# Patient Record
Sex: FEMALE | Race: NATIVE HAWAIIAN OR OTHER PACIFIC ISLANDER | HISPANIC OR LATINO
[De-identification: names, ages, dates, MRNs, and addresses within clinical notes are randomized per-mention and may not be internally consistent; named-entity substitution may affect disease eponyms.]

---

## 2018-11-15 ENCOUNTER — RESULT REVIEW (OUTPATIENT)
Age: 28
End: 2018-11-15

## 2019-03-25 PROBLEM — Z00.00 ENCOUNTER FOR PREVENTIVE HEALTH EXAMINATION: Status: ACTIVE | Noted: 2019-03-25

## 2019-03-26 ENCOUNTER — ASOB RESULT (OUTPATIENT)
Age: 29
End: 2019-03-26

## 2019-03-26 ENCOUNTER — APPOINTMENT (OUTPATIENT)
Dept: ANTEPARTUM | Facility: CLINIC | Age: 29
End: 2019-03-26
Payer: COMMERCIAL

## 2019-03-26 PROCEDURE — 76811 OB US DETAILED SNGL FETUS: CPT

## 2019-06-14 ENCOUNTER — APPOINTMENT (OUTPATIENT)
Dept: ANTEPARTUM | Facility: CLINIC | Age: 29
End: 2019-06-14

## 2019-06-17 ENCOUNTER — APPOINTMENT (OUTPATIENT)
Dept: ANTEPARTUM | Facility: CLINIC | Age: 29
End: 2019-06-17
Payer: COMMERCIAL

## 2019-06-17 ENCOUNTER — ASOB RESULT (OUTPATIENT)
Age: 29
End: 2019-06-17

## 2019-06-17 PROCEDURE — 76819 FETAL BIOPHYS PROFIL W/O NST: CPT

## 2019-06-17 PROCEDURE — 76816 OB US FOLLOW-UP PER FETUS: CPT

## 2019-07-25 ENCOUNTER — APPOINTMENT (OUTPATIENT)
Dept: ANTEPARTUM | Facility: HOSPITAL | Age: 29
End: 2019-07-25

## 2019-07-25 ENCOUNTER — ASOB RESULT (OUTPATIENT)
Age: 29
End: 2019-07-25

## 2019-07-25 ENCOUNTER — OUTPATIENT (OUTPATIENT)
Dept: INPATIENT UNIT | Facility: HOSPITAL | Age: 29
LOS: 1 days | Discharge: HOME | End: 2019-07-25
Payer: COMMERCIAL

## 2019-07-25 VITALS
HEART RATE: 70 BPM | DIASTOLIC BLOOD PRESSURE: 69 MMHG | RESPIRATION RATE: 17 BRPM | TEMPERATURE: 98 F | SYSTOLIC BLOOD PRESSURE: 107 MMHG

## 2019-07-25 VITALS — SYSTOLIC BLOOD PRESSURE: 91 MMHG | DIASTOLIC BLOOD PRESSURE: 55 MMHG | HEART RATE: 68 BPM

## 2019-07-25 DIAGNOSIS — O26.899 OTHER SPECIFIED PREGNANCY RELATED CONDITIONS, UNSPECIFIED TRIMESTER: ICD-10-CM

## 2019-07-25 DIAGNOSIS — Z3A.00 WEEKS OF GESTATION OF PREGNANCY NOT SPECIFIED: ICD-10-CM

## 2019-07-25 PROBLEM — Z00.00 ENCOUNTER FOR PREVENTIVE HEALTH EXAMINATION: Status: ACTIVE | Noted: 2019-07-25

## 2019-07-25 PROCEDURE — 59025 FETAL NON-STRESS TEST: CPT | Mod: 26

## 2019-07-25 NOTE — OB PROVIDER TRIAGE NOTE - NSOBPROVIDERNOTE_OBGYN_ALL_OB_FT
27yo P0 @ 38.0 wks SLIUP uncomp PNC here co decreased FM since yesterday  -Awaiting official ATALBERTO ingram 29yo P0 @ 38.0 wks SLIUP uncomp PNC here co decreased FM since yesterday  -ATU sono reveals 8/8 BPP; pt reports GFM while sono is being performed  -await NST 29yo P0 @ 38.0 wks SLIUP uncomp PNC here co decreased FM since yesterday  -ATU sono reveals 8/8 BPP; pt reports GFM while sono is being performed  -NST is cat I with accels and mod variability; irreg ctx's.   -pt was dw Dr Oviedo (in to see pt). Pt was dc home with instructions to keep her appt for Monday

## 2019-07-25 NOTE — OB PROVIDER TRIAGE NOTE - HISTORY OF PRESENT ILLNESS
27yo P0 @ 38.0 wks SLIUP uncomp PNC here co decreased FM since yesterday. Pt is intact and denies VB/LOF/ctx's.    Pmhx-denies  Pshx/Hosp-denies  Meds-PNV  NKDA  Past ob/Gyn-denies

## 2019-08-07 ENCOUNTER — TRANSCRIPTION ENCOUNTER (OUTPATIENT)
Age: 29
End: 2019-08-07

## 2019-08-07 ENCOUNTER — INPATIENT (INPATIENT)
Facility: HOSPITAL | Age: 29
LOS: 1 days | Discharge: HOME | End: 2019-08-09
Attending: OBSTETRICS & GYNECOLOGY | Admitting: OBSTETRICS & GYNECOLOGY

## 2019-08-07 VITALS
DIASTOLIC BLOOD PRESSURE: 72 MMHG | SYSTOLIC BLOOD PRESSURE: 121 MMHG | RESPIRATION RATE: 16 BRPM | TEMPERATURE: 98 F | HEART RATE: 83 BPM

## 2019-08-07 DIAGNOSIS — O26.899 OTHER SPECIFIED PREGNANCY RELATED CONDITIONS, UNSPECIFIED TRIMESTER: ICD-10-CM

## 2019-08-07 DIAGNOSIS — Z3A.00 WEEKS OF GESTATION OF PREGNANCY NOT SPECIFIED: ICD-10-CM

## 2019-08-07 LAB
BASOPHILS # BLD AUTO: 0.02 K/UL — SIGNIFICANT CHANGE UP (ref 0–0.2)
BASOPHILS NFR BLD AUTO: 0.2 % — SIGNIFICANT CHANGE UP (ref 0–2)
BLD GP AB SCN SERPL QL: NEGATIVE — SIGNIFICANT CHANGE UP
EOSINOPHIL # BLD AUTO: 0 K/UL — SIGNIFICANT CHANGE UP (ref 0–0.5)
EOSINOPHIL NFR BLD AUTO: 0 % — SIGNIFICANT CHANGE UP (ref 0–6)
HBV SURFACE AG SER-ACNC: NEGATIVE — SIGNIFICANT CHANGE UP
HCT VFR BLD CALC: 35.9 % — SIGNIFICANT CHANGE UP (ref 34.5–45)
HGB BLD-MCNC: 11.3 G/DL — LOW (ref 11.5–15.5)
HIV COMBO RESULT: SIGNIFICANT CHANGE UP
HIV1+2 AB SPEC QL: SIGNIFICANT CHANGE UP
IMM GRANULOCYTES NFR BLD AUTO: 0.6 % — SIGNIFICANT CHANGE UP (ref 0–1.5)
LYMPHOCYTES # BLD AUTO: 1.37 K/UL — SIGNIFICANT CHANGE UP (ref 1–3.3)
LYMPHOCYTES # BLD AUTO: 11.4 % — LOW (ref 13–44)
MCHC RBC-ENTMCNC: 26.4 PG — LOW (ref 27–34)
MCHC RBC-ENTMCNC: 31.5 % — LOW (ref 32–36)
MCV RBC AUTO: 83.9 FL — SIGNIFICANT CHANGE UP (ref 80–100)
MONOCYTES # BLD AUTO: 0.61 K/UL — SIGNIFICANT CHANGE UP (ref 0–0.9)
MONOCYTES NFR BLD AUTO: 5.1 % — SIGNIFICANT CHANGE UP (ref 2–14)
NEUTROPHILS # BLD AUTO: 9.9 K/UL — HIGH (ref 1.8–7.4)
NEUTROPHILS NFR BLD AUTO: 82.7 % — HIGH (ref 43–77)
NRBC # FLD: 0 K/UL — SIGNIFICANT CHANGE UP (ref 0–0)
PLATELET # BLD AUTO: 158 K/UL — SIGNIFICANT CHANGE UP (ref 150–400)
PMV BLD: 12.1 FL — SIGNIFICANT CHANGE UP (ref 7–13)
RBC # BLD: 4.28 M/UL — SIGNIFICANT CHANGE UP (ref 3.8–5.2)
RBC # FLD: 22.5 % — HIGH (ref 10.3–14.5)
RH IG SCN BLD-IMP: POSITIVE — SIGNIFICANT CHANGE UP
WBC # BLD: 11.97 K/UL — HIGH (ref 3.8–10.5)
WBC # FLD AUTO: 11.97 K/UL — HIGH (ref 3.8–10.5)

## 2019-08-07 RX ORDER — OXYTOCIN 10 UNIT/ML
2 VIAL (ML) INJECTION
Qty: 30 | Refills: 0 | Status: DISCONTINUED | OUTPATIENT
Start: 2019-08-07 | End: 2019-08-08

## 2019-08-07 RX ORDER — DOCUSATE SODIUM 100 MG
100 CAPSULE ORAL
Refills: 0 | Status: DISCONTINUED | OUTPATIENT
Start: 2019-08-07 | End: 2019-08-08

## 2019-08-07 RX ORDER — SIMETHICONE 80 MG/1
80 TABLET, CHEWABLE ORAL EVERY 4 HOURS
Refills: 0 | Status: DISCONTINUED | OUTPATIENT
Start: 2019-08-07 | End: 2019-08-09

## 2019-08-07 RX ORDER — BENZOCAINE 10 %
1 GEL (GRAM) MUCOUS MEMBRANE EVERY 6 HOURS
Refills: 0 | Status: DISCONTINUED | OUTPATIENT
Start: 2019-08-07 | End: 2019-08-09

## 2019-08-07 RX ORDER — AER TRAVELER 0.5 G/1
1 SOLUTION RECTAL; TOPICAL EVERY 4 HOURS
Refills: 0 | Status: DISCONTINUED | OUTPATIENT
Start: 2019-08-07 | End: 2019-08-09

## 2019-08-07 RX ORDER — SODIUM CHLORIDE 9 MG/ML
1000 INJECTION, SOLUTION INTRAVENOUS
Refills: 0 | Status: DISCONTINUED | OUTPATIENT
Start: 2019-08-07 | End: 2019-08-07

## 2019-08-07 RX ORDER — HYDROCORTISONE 1 %
1 OINTMENT (GRAM) TOPICAL EVERY 6 HOURS
Refills: 0 | Status: DISCONTINUED | OUTPATIENT
Start: 2019-08-07 | End: 2019-08-09

## 2019-08-07 RX ORDER — OXYTOCIN 10 UNIT/ML
333.33 VIAL (ML) INJECTION
Qty: 20 | Refills: 0 | Status: DISCONTINUED | OUTPATIENT
Start: 2019-08-07 | End: 2019-08-08

## 2019-08-07 RX ORDER — SODIUM CHLORIDE 9 MG/ML
3 INJECTION INTRAMUSCULAR; INTRAVENOUS; SUBCUTANEOUS EVERY 8 HOURS
Refills: 0 | Status: DISCONTINUED | OUTPATIENT
Start: 2019-08-07 | End: 2019-08-09

## 2019-08-07 RX ORDER — PRAMOXINE HYDROCHLORIDE 150 MG/15G
1 AEROSOL, FOAM RECTAL EVERY 4 HOURS
Refills: 0 | Status: DISCONTINUED | OUTPATIENT
Start: 2019-08-07 | End: 2019-08-09

## 2019-08-07 RX ORDER — LANOLIN
1 OINTMENT (GRAM) TOPICAL EVERY 6 HOURS
Refills: 0 | Status: DISCONTINUED | OUTPATIENT
Start: 2019-08-07 | End: 2019-08-09

## 2019-08-07 RX ORDER — GLYCERIN ADULT
1 SUPPOSITORY, RECTAL RECTAL AT BEDTIME
Refills: 0 | Status: DISCONTINUED | OUTPATIENT
Start: 2019-08-07 | End: 2019-08-09

## 2019-08-07 RX ORDER — DIPHENHYDRAMINE HCL 50 MG
25 CAPSULE ORAL EVERY 6 HOURS
Refills: 0 | Status: DISCONTINUED | OUTPATIENT
Start: 2019-08-07 | End: 2019-08-09

## 2019-08-07 RX ORDER — OXYCODONE HYDROCHLORIDE 5 MG/1
5 TABLET ORAL
Refills: 0 | Status: DISCONTINUED | OUTPATIENT
Start: 2019-08-07 | End: 2019-08-09

## 2019-08-07 RX ORDER — TETANUS TOXOID, REDUCED DIPHTHERIA TOXOID AND ACELLULAR PERTUSSIS VACCINE, ADSORBED 5; 2.5; 8; 8; 2.5 [IU]/.5ML; [IU]/.5ML; UG/.5ML; UG/.5ML; UG/.5ML
0.5 SUSPENSION INTRAMUSCULAR ONCE
Refills: 0 | Status: DISCONTINUED | OUTPATIENT
Start: 2019-08-07 | End: 2019-08-09

## 2019-08-07 RX ORDER — OXYCODONE HYDROCHLORIDE 5 MG/1
5 TABLET ORAL ONCE
Refills: 0 | Status: DISCONTINUED | OUTPATIENT
Start: 2019-08-07 | End: 2019-08-09

## 2019-08-07 RX ORDER — IBUPROFEN 200 MG
600 TABLET ORAL EVERY 6 HOURS
Refills: 0 | Status: DISCONTINUED | OUTPATIENT
Start: 2019-08-07 | End: 2019-08-09

## 2019-08-07 RX ORDER — ONDANSETRON 8 MG/1
4 TABLET, FILM COATED ORAL ONCE
Refills: 0 | Status: COMPLETED | OUTPATIENT
Start: 2019-08-07 | End: 2019-08-07

## 2019-08-07 RX ORDER — DIBUCAINE 1 %
1 OINTMENT (GRAM) RECTAL EVERY 6 HOURS
Refills: 0 | Status: DISCONTINUED | OUTPATIENT
Start: 2019-08-07 | End: 2019-08-09

## 2019-08-07 RX ORDER — KETOROLAC TROMETHAMINE 30 MG/ML
30 SYRINGE (ML) INJECTION ONCE
Refills: 0 | Status: DISCONTINUED | OUTPATIENT
Start: 2019-08-07 | End: 2019-08-07

## 2019-08-07 RX ORDER — ACETAMINOPHEN 500 MG
975 TABLET ORAL
Refills: 0 | Status: DISCONTINUED | OUTPATIENT
Start: 2019-08-07 | End: 2019-08-09

## 2019-08-07 RX ORDER — MAGNESIUM HYDROXIDE 400 MG/1
30 TABLET, CHEWABLE ORAL
Refills: 0 | Status: DISCONTINUED | OUTPATIENT
Start: 2019-08-07 | End: 2019-08-09

## 2019-08-07 RX ADMIN — Medication 1000 MILLIUNIT(S)/MIN: at 21:43

## 2019-08-07 RX ADMIN — Medication 2 MILLIUNIT(S)/MIN: at 17:19

## 2019-08-07 RX ADMIN — Medication 30 MILLIGRAM(S): at 23:30

## 2019-08-07 RX ADMIN — ONDANSETRON 4 MILLIGRAM(S): 8 TABLET, FILM COATED ORAL at 22:39

## 2019-08-07 RX ADMIN — SODIUM CHLORIDE 125 MILLILITER(S): 9 INJECTION, SOLUTION INTRAVENOUS at 21:48

## 2019-08-07 RX ADMIN — Medication 0.25 MILLIGRAM(S): at 16:38

## 2019-08-07 RX ADMIN — Medication 2 MILLIUNIT(S)/MIN: at 13:08

## 2019-08-07 RX ADMIN — Medication 30 MILLIGRAM(S): at 22:38

## 2019-08-07 NOTE — CHART NOTE - NSCHARTNOTEFT_GEN_A_CORE
Subjective  Patient seen and evaluated at bedside for recurrent early decels. Patient reports increased pressure. While at bedside, FHR dropped to 80 bpm for 6 minutes. Terbutaline 0.25mg IM given and pitocin was stopped. Tracing improved with left lateral position, O2, and IVF    Objective  Vital Signs Last 24 Hrs  T(C): 36 (07 Aug 2019 11:02), Max: 36.8 (07 Aug 2019 09:49)  T(F): 96.8 (07 Aug 2019 11:02), Max: 98.2 (07 Aug 2019 09:49)  HR: 85 (07 Aug 2019 16:51) (59 - 96)  BP: 98/57 (07 Aug 2019 16:51) (98/57 - 152/65)  BP(mean): --  RR: 18 (07 Aug 2019 11:02) (16 - 18)  SpO2: 97% (07 Aug 2019 16:55) (91% - 99%)    Gen: NAD  VE: 9.5/100/2  FHT: 135 baseline, mod variability, +accels, neg decels  [place]: q2-3 min    A/P 27 yo  in labor   - will rest for 20min for tracing to recover  - will restart pitocin after 20min if CTX do not return     seen with Dr. Shaun Perez pgy3

## 2019-08-07 NOTE — OB PROVIDER TRIAGE NOTE - NSHPPHYSICALEXAM_GEN_ALL_CORE
Assessment reveals VSS, abdomen soft, NT, gravid.  VE 6/80/-2  EFW 8.5 by leopold  vtx confirmed by sono

## 2019-08-07 NOTE — CHART NOTE - NSCHARTNOTEFT_GEN_A_CORE
R3 Ob Note    Subjective  Patient seen at bedside. Report increased pelvic pressure. Unsure if she has experienced LOF. Comfortable with epidural in place    Objective  Vital Signs Last 24 Hrs  T(C): 36 (07 Aug 2019 11:02), Max: 36.8 (07 Aug 2019 09:49)  T(F): 96.8 (07 Aug 2019 11:02), Max: 98.2 (07 Aug 2019 09:49)  HR: 68 (07 Aug 2019 16:00) (59 - 92)  BP: 128/75 (07 Aug 2019 14:06) (101/59 - 152/65)  BP(mean): --  RR: 18 (07 Aug 2019 11:02) (16 - 18)  SpO2: 96% (07 Aug 2019 16:00) (91% - 99%)    FETAL HEART RATE   Baseline: 135 baseline, mod variability, +accels, neg decels    UTERINE CONTRACTIONS: reg ctx q2-3 min    CERVICAL EXAM:  Dilation: 8.5  Effacement: 100  Station: -1    IMPRESSION:   29 yo  39w6d a/w labor    INTERVENTIONS:  - arom, clear fluid  - continue pitocin  - peanut ball    D/w Dr. Shaun Perez pgy3

## 2019-08-07 NOTE — OB PROVIDER H&P - HISTORY OF PRESENT ILLNESS
29yo  @ 39.6 presents with c/o contractions every 3-4 minutes and bloody show. Denies LOF, and reports GFM.     Denies PMH/PSH    AP course uncomplicated  GBS unknown  EFW 8-3 last week

## 2019-08-07 NOTE — OB PROVIDER TRIAGE NOTE - HISTORY OF PRESENT ILLNESS
27yo  @ 39.6 presents with c/o contractions every 3-4 minutes and bloody show. Denies LOF, and reports GFM.     Denies PMH/PSH    AP course uncomplicated  GBS unknown  EFW 8-3 last week

## 2019-08-07 NOTE — OB NEONATOLOGY/PEDIATRICIAN DELIVERY SUMMARY - NSPEDSNEONOTESA_OBGYN_ALL_OB_FT
39.6 wk male born to a 29 y/o  mother via . Called to delivery for category II tracing. No significant maternal or prenatal hx. Maternal blood type A+. Prenatal labs negative, non-reactive and immune. GBS negative on . AROM at 15:36 (<18 hours) with clear fluids. Baby was born vigorous and crying spontaneously. W/D/S/S. APGARS 9/9. EOS 0.02    Mom is planning on breast feeding, yes hep B vaccination, yes circumcision    Physical Exam:  Skin: WWP, pink  Head: +caput succedaneum, AFOF, no dysmorphic features  Ears: no pits or tags, no deformity  Nose: nares patent  Mouth: no cleft, palate intact  Trunk: No crepitus, lungs CTAB with normal work of breathing  Cardiac: S1S2 regular rate, no murmur  Abdomen: Soft, nontender, not distended, no masses  Umbillical cord: 3 vessel  Extremities: FROM, negative ortolani/echeverria bilaterally  Spine/anus: No sacral dimple, anus patent  Genitalia: normal male external genitalia, testicles descended bilaterally  Neuro: +grasp +orlin +suck

## 2019-08-07 NOTE — OB PROVIDER DELIVERY SUMMARY - NSPROVIDERDELIVERYNOTE_OBGYN_ALL_OB_FT
Spontaneous vaginal delivery of liveborn infant from the OA position. Head, shoulders, and body delivered easily. Infant was suctioned. No mec. Cord was clamped and cut and infant was passed to mother. Placenta delivered intact with a 3 vessel cord. Fundal massage was given and uterine fundus was found to be firm. Vaginal exam revealed an intact cervix, vaginal walls and sulci. Patient had a 3rd degree laceration in the perineum that was repaired with 2.0 vicryl suture. 2.0 vicryl suture was used to tie the 4 aspects of the external anal sphincter muscle together. The rest of the tear was repaired with 2.0 vicryl suture in the usual fashion. Excellent hemostasis was noted. patient was stable and went to recovery. Count was correct x2. Spontaneous vaginal delivery of liveborn infant from the OA position. Head, shoulders, and body delivered easily. Infant was suctioned. No mec. Cord was clamped and cut and infant was passed to mother. Placenta delivered intact with a 3 vessel cord. Fundal massage was given and uterine fundus was initially atonic, but quickly became firm.  B/L sulcal tears and 3rd degree laceration in the perineum were repaired with 2.0 chromic suture.  Excellent hemostasis was noted. Fundus firm.  Vault empty.  Rectal exam WNL.

## 2019-08-07 NOTE — OB PROVIDER DELIVERY SUMMARY - NSLACERATRAPAIRDETAILS_OBGYN_ALL_OB_FT
B/L sulcal tears and 3rd degree laceration repaired with 2-0 chromic with excellent hemostasis and restoration of anatomy.

## 2019-08-08 LAB
ANISOCYTOSIS BLD QL: SLIGHT — SIGNIFICANT CHANGE UP
BASOPHILS # BLD AUTO: 0.02 K/UL — SIGNIFICANT CHANGE UP (ref 0–0.2)
BASOPHILS NFR BLD AUTO: 0.2 % — SIGNIFICANT CHANGE UP (ref 0–2)
BASOPHILS NFR SPEC: 0 % — SIGNIFICANT CHANGE UP (ref 0–2)
BLASTS # FLD: 0 % — SIGNIFICANT CHANGE UP (ref 0–0)
EOSINOPHIL # BLD AUTO: 0.03 K/UL — SIGNIFICANT CHANGE UP (ref 0–0.5)
EOSINOPHIL NFR BLD AUTO: 0.2 % — SIGNIFICANT CHANGE UP (ref 0–6)
EOSINOPHIL NFR FLD: 0 % — SIGNIFICANT CHANGE UP (ref 0–6)
HCT VFR BLD CALC: 26.3 % — LOW (ref 34.5–45)
HGB BLD-MCNC: 8.1 G/DL — LOW (ref 11.5–15.5)
IMM GRANULOCYTES NFR BLD AUTO: 0.6 % — SIGNIFICANT CHANGE UP (ref 0–1.5)
LYMPHOCYTES # BLD AUTO: 1.65 K/UL — SIGNIFICANT CHANGE UP (ref 1–3.3)
LYMPHOCYTES # BLD AUTO: 12.8 % — LOW (ref 13–44)
LYMPHOCYTES NFR SPEC AUTO: 4.4 % — LOW (ref 13–44)
MCHC RBC-ENTMCNC: 25.7 PG — LOW (ref 27–34)
MCHC RBC-ENTMCNC: 30.8 % — LOW (ref 32–36)
MCV RBC AUTO: 83.5 FL — SIGNIFICANT CHANGE UP (ref 80–100)
METAMYELOCYTES # FLD: 0 % — SIGNIFICANT CHANGE UP (ref 0–1)
MICROCYTES BLD QL: SLIGHT — SIGNIFICANT CHANGE UP
MONOCYTES # BLD AUTO: 1.01 K/UL — HIGH (ref 0–0.9)
MONOCYTES NFR BLD AUTO: 7.8 % — SIGNIFICANT CHANGE UP (ref 2–14)
MONOCYTES NFR BLD: 0 % — LOW (ref 2–9)
MYELOCYTES NFR BLD: 0 % — SIGNIFICANT CHANGE UP (ref 0–0)
NEUTROPHIL AB SER-ACNC: 93 % — HIGH (ref 43–77)
NEUTROPHILS # BLD AUTO: 10.13 K/UL — HIGH (ref 1.8–7.4)
NEUTROPHILS NFR BLD AUTO: 78.4 % — HIGH (ref 43–77)
NEUTS BAND # BLD: 0.9 % — SIGNIFICANT CHANGE UP (ref 0–6)
NRBC # FLD: 0 K/UL — SIGNIFICANT CHANGE UP (ref 0–0)
OTHER - HEMATOLOGY %: 0 — SIGNIFICANT CHANGE UP
OVALOCYTES BLD QL SMEAR: SLIGHT — SIGNIFICANT CHANGE UP
PLATELET # BLD AUTO: 121 K/UL — LOW (ref 150–400)
PLATELET COUNT - ESTIMATE: SIGNIFICANT CHANGE UP
PMV BLD: 11.2 FL — SIGNIFICANT CHANGE UP (ref 7–13)
POIKILOCYTOSIS BLD QL AUTO: SLIGHT — SIGNIFICANT CHANGE UP
PROMYELOCYTES # FLD: 0 % — SIGNIFICANT CHANGE UP (ref 0–0)
RBC # BLD: 3.15 M/UL — LOW (ref 3.8–5.2)
RBC # FLD: 22.3 % — HIGH (ref 10.3–14.5)
T PALLIDUM AB TITR SER: NEGATIVE — SIGNIFICANT CHANGE UP
VARIANT LYMPHS # BLD: 1.7 % — SIGNIFICANT CHANGE UP
WBC # BLD: 12.92 K/UL — HIGH (ref 3.8–10.5)
WBC # FLD AUTO: 12.92 K/UL — HIGH (ref 3.8–10.5)

## 2019-08-08 RX ORDER — DOCUSATE SODIUM 100 MG
100 CAPSULE ORAL THREE TIMES A DAY
Refills: 0 | Status: DISCONTINUED | OUTPATIENT
Start: 2019-08-08 | End: 2019-08-09

## 2019-08-08 RX ORDER — IBUPROFEN 200 MG
1 TABLET ORAL
Qty: 0 | Refills: 0 | DISCHARGE
Start: 2019-08-08

## 2019-08-08 RX ADMIN — Medication 975 MILLIGRAM(S): at 14:30

## 2019-08-08 RX ADMIN — Medication 100 MILLIGRAM(S): at 22:10

## 2019-08-08 RX ADMIN — Medication 975 MILLIGRAM(S): at 22:11

## 2019-08-08 RX ADMIN — SODIUM CHLORIDE 3 MILLILITER(S): 9 INJECTION INTRAMUSCULAR; INTRAVENOUS; SUBCUTANEOUS at 15:01

## 2019-08-08 RX ADMIN — SODIUM CHLORIDE 3 MILLILITER(S): 9 INJECTION INTRAMUSCULAR; INTRAVENOUS; SUBCUTANEOUS at 06:56

## 2019-08-08 RX ADMIN — Medication 975 MILLIGRAM(S): at 15:03

## 2019-08-08 RX ADMIN — Medication 975 MILLIGRAM(S): at 22:40

## 2019-08-08 RX ADMIN — Medication 1 TABLET(S): at 15:01

## 2019-08-08 RX ADMIN — Medication 975 MILLIGRAM(S): at 06:56

## 2019-08-08 RX ADMIN — Medication 975 MILLIGRAM(S): at 06:14

## 2019-08-08 RX ADMIN — Medication 100 MILLIGRAM(S): at 06:14

## 2019-08-08 RX ADMIN — Medication 100 MILLIGRAM(S): at 15:01

## 2019-08-08 NOTE — DISCHARGE NOTE OB - MEDICATION SUMMARY - MEDICATIONS TO TAKE
I will START or STAY ON the medications listed below when I get home from the hospital:    ibuprofen 600 mg oral tablet  -- 1 tab(s) by mouth every 6 hours  -- Indication: For pain    Prenatal 1 oral capsule  -- Indication: For supplement I will START or STAY ON the medications listed below when I get home from the hospital:    ibuprofen 600 mg oral tablet  -- 1 tab(s) by mouth every 6 hours  -- Indication: For pain    Prenatal 1 oral capsule  -- Indication: For supplement    FeroSul 325 mg (65 mg elemental iron) oral tablet  -- 1 tab(s) by mouth 2 times a day   -- Check with your doctor before becoming pregnant.  Do not chew, break, or crush.  May discolor urine or feces.    -- Indication: For angella    Colace 100 mg oral capsule  -- 1 cap(s) by mouth 2 times a day   -- Medication should be taken with plenty of water.    -- Indication: For stool softner

## 2019-08-08 NOTE — LACTATION INITIAL EVALUATION - INTERVENTION OUTCOME
nbn demonstrated  deep latch and  performed  with sucking and swallowing  noted .  offered assistance as needed./verbalizes understanding

## 2019-08-08 NOTE — DISCHARGE NOTE OB - MEDICATION SUMMARY - MEDICATIONS TO STOP TAKING
I will STOP taking the medications listed below when I get home from the hospital:  None obese. abdomen soft, non-tender, and non-distended. Bowel sounds present.

## 2019-08-08 NOTE — DISCHARGE NOTE OB - PATIENT PORTAL LINK FT
You can access the [facility] Patient Portal, offered by [facility], by registering with the following website: [email or web address]

## 2019-08-08 NOTE — DISCHARGE NOTE OB - CARE PROVIDER_API CALL
Guerda Oviedo)  Obstetrics and Gynecology  [address] [place] [address]  Phone: [phone]  Fax: [phone]  Follow Up Time:

## 2019-08-08 NOTE — LACTATION INITIAL EVALUATION - LACTATION INTERVENTIONS
assisted with deep latch and positioning . reviewed  breastfeeding  log  and daily  nbn  goals.  discussed  signs  of  effective  feeding and  swallowing.  discussed  compression at  breast when  nbn  stops  drinking  and  is  still sucking.  instructed  to offer both  breast at a feeding ,feed on cue and safe  skin to skin./initiate skin to skin/initiate hand expression routine

## 2019-08-09 VITALS
TEMPERATURE: 98 F | RESPIRATION RATE: 18 BRPM | SYSTOLIC BLOOD PRESSURE: 98 MMHG | HEART RATE: 81 BPM | OXYGEN SATURATION: 98 % | DIASTOLIC BLOOD PRESSURE: 64 MMHG

## 2019-08-09 LAB
HCT VFR BLD CALC: 24.7 % — LOW (ref 34.5–45)
HGB BLD-MCNC: 7.8 G/DL — LOW (ref 11.5–15.5)
MCHC RBC-ENTMCNC: 26.7 PG — LOW (ref 27–34)
MCHC RBC-ENTMCNC: 31.6 % — LOW (ref 32–36)
MCV RBC AUTO: 84.6 FL — SIGNIFICANT CHANGE UP (ref 80–100)
NRBC # FLD: 0 K/UL — SIGNIFICANT CHANGE UP (ref 0–0)
PLATELET # BLD AUTO: 131 K/UL — LOW (ref 150–400)
PMV BLD: 12.1 FL — SIGNIFICANT CHANGE UP (ref 7–13)
RBC # BLD: 2.92 M/UL — LOW (ref 3.8–5.2)
RBC # FLD: 22.5 % — HIGH (ref 10.3–14.5)
RUBV IGG SER-ACNC: 13.2 INDEX — SIGNIFICANT CHANGE UP
RUBV IGG SER-IMP: POSITIVE — SIGNIFICANT CHANGE UP
WBC # BLD: 12.08 K/UL — HIGH (ref 3.8–10.5)
WBC # FLD AUTO: 12.08 K/UL — HIGH (ref 3.8–10.5)

## 2019-08-09 RX ORDER — DOCUSATE SODIUM 100 MG
1 CAPSULE ORAL
Qty: 60 | Refills: 0
Start: 2019-08-09 | End: 2019-09-07

## 2019-08-09 RX ORDER — FERROUS SULFATE 325(65) MG
1 TABLET ORAL
Qty: 60 | Refills: 0
Start: 2019-08-09 | End: 2019-09-07

## 2019-08-09 RX ADMIN — Medication 975 MILLIGRAM(S): at 05:45

## 2019-08-09 RX ADMIN — Medication 1 TABLET(S): at 11:27

## 2019-08-09 RX ADMIN — Medication 975 MILLIGRAM(S): at 05:18

## 2019-08-09 RX ADMIN — Medication 975 MILLIGRAM(S): at 11:28

## 2019-08-09 RX ADMIN — Medication 100 MILLIGRAM(S): at 05:17

## 2019-08-09 NOTE — PROGRESS NOTE ADULT - PROBLEM SELECTOR PLAN 1
- Pain well controlled, continue current pain regimen  - Increase ambulation, SCDs when not ambulating  - Continue regular diet  - Discharge planning     Jackeline Grady, PGY-1
- Pain well controlled, continue current pain regimen  - Increase ambulation, SCDs when not ambulating  - Continue regular diet    Jackeline Grady, PGY-1

## 2019-08-09 NOTE — PROGRESS NOTE ADULT - SUBJECTIVE AND OBJECTIVE BOX
Anesthesia Post-op Note    POD#1 S/P     Patient is doing well.  OOBAA.  Pain is tolerable.  No complaints of Headache. Pt reports able to void.  No residual anesthetic issues or complications noted.    T(C): 36.7 (19 @ 05:21), Max: 37.3 (19 @ 02:14)  HR: 97 (19 @ 05:21) (59 - 120)  BP: 92/52 (19 @ 05:21) (90/53 - 152/65)  RR: 18 (19 @ 05:21) (18 - 18)  SpO2: 100% (19 @ 05:21) (88% - 100%)
Patient comfortable post epi    MEDICATIONS  (STANDING):  lactated ringers. 1000 milliLiter(s) (125 mL/Hr) IV Continuous <Continuous>  oxytocin Infusion 333.333 milliUNIT(s)/Min (1000 mL/Hr) IV Continuous <Continuous>    Allergies    No Known Allergies    IVital Signs Last 24 Hrs  T(C): 36 (07 Aug 2019 11:02), Max: 36.8 (07 Aug 2019 09:49)  T(F): 96.8 (07 Aug 2019 11:02), Max: 98.2 (07 Aug 2019 09:49)  HR: 68 (07 Aug 2019 12:28) (68 - 92)  BP: 152/65 (07 Aug 2019 12:28) (104/65 - 152/65)  BP(mean): --  RR: 18 (07 Aug 2019 11:02) (16 - 18)  SpO2: 98% (07 Aug 2019 12:25) (91% - 99%)    Last Menstrual Period      PHYSICAL EXAM:    EFW 8.5 lb  Extremities: no swelling or calf tenderness, reflexes +2 bilaterally  SVE 6/90/-2 intact, pelvis adequate by exam    EFM +moderate variability +accels -decels ctx q 5        LABS:                        11.3   11.97 )-----------( 158      ( 07 Aug 2019 10:55 )             35.9
S: Patient doing well. Minimal lochia. Pain controlled.    O: Vital Signs Last 24 Hrs  T(C): 37 (08 Aug 2019 17:45), Max: 37.3 (08 Aug 2019 02:14)  T(F): 98.6 (08 Aug 2019 17:45), Max: 99.1 (08 Aug 2019 02:14)  HR: 79 (08 Aug 2019 17:45) (78 - 97)  BP: 100/58 (08 Aug 2019 17:45) (92/52 - 108/54)  BP(mean): --  RR: 18 (08 Aug 2019 17:45) (18 - 18)  SpO2: 98% (08 Aug 2019 17:45) (98% - 100%)    Gen: NAD  Abd: soft, NT, ND, fundus firm below umbilicus  Lochia: moderate  Ext: no tenderness    Labs:                        8.1    12.92 )-----------( 121      ( 08 Aug 2019 06:25 )             26.3       A: 28y PPD# s/p  doing well.    Plan: asymptomatic anemia - continue pp care for discharge in am
OB Progress Note:  PPD#2    S: 27yo PPD#2 s/p . Patient feels well. Pain is well controlled, tolerating regular diet, passing flatus, voiding spontaneously, ambulating without difficulty. Denies heavy vaginal bleeding, CP/SOB, leadheadedness/dizziness, N/V.    O:  Vitals:   Vital Signs Last 24 Hrs  T(C): 36.7 (09 Aug 2019 06:54), Max: 37 (08 Aug 2019 17:45)  T(F): 98.1 (09 Aug 2019 06:54), Max: 98.6 (08 Aug 2019 17:45)  HR: 81 (09 Aug 2019 06:54) (79 - 81)  BP: 98/64 (09 Aug 2019 06:54) (98/64 - 100/58)  BP(mean): --  RR: 18 (09 Aug 2019 06:54) (18 - 18)  SpO2: 98% (09 Aug 2019 06:54) (98% - 98%)    MEDICATIONS  (STANDING):  acetaminophen   Tablet .. 975 milliGRAM(s) Oral <User Schedule>  diphtheria/tetanus/pertussis (acellular) Vaccine (ADAcel) 0.5 milliLiter(s) IntraMuscular once  docusate sodium 100 milliGRAM(s) Oral three times a day  ibuprofen  Tablet. 600 milliGRAM(s) Oral every 6 hours  prenatal multivitamin 1 Tablet(s) Oral daily  sodium chloride 0.9% lock flush 3 milliLiter(s) IV Push every 8 hours    MEDICATIONS  (PRN):  benzocaine 20%/menthol 0.5% Spray 1 Spray(s) Topical every 6 hours PRN for Perineal discomfort  dibucaine 1% Ointment 1 Application(s) Topical every 6 hours PRN Perineal discomfort  diphenhydrAMINE 25 milliGRAM(s) Oral every 6 hours PRN Pruritus  glycerin Suppository - Adult 1 Suppository(s) Rectal at bedtime PRN Constipation  hydrocortisone 1% Cream 1 Application(s) Topical every 6 hours PRN Moderate Pain (4-6)  lanolin Ointment 1 Application(s) Topical every 6 hours PRN nipple soreness  magnesium hydroxide Suspension 30 milliLiter(s) Oral two times a day PRN Constipation  oxyCODONE    IR 5 milliGRAM(s) Oral every 3 hours PRN Moderate to Severe Pain (4-10)  oxyCODONE    IR 5 milliGRAM(s) Oral once PRN Moderate to Severe Pain (4-10)  pramoxine 1%/zinc 5% Cream 1 Application(s) Topical every 4 hours PRN Moderate Pain (4-6)  simethicone 80 milliGRAM(s) Chew every 4 hours PRN Gas  witch hazel Pads 1 Application(s) Topical every 4 hours PRN Perineal discomfort      Labs:  Blood type: A Positive  Rubella IgG: Positive ( @ 14:30)  RPR: Negative                          7.8<L>   12.08<H> >-----------< 131<L>    (  @ 07:27 )             24.7<L>                        8.1<L>   12.92<H> >-----------< 121<L>    (  @ 06:25 )             26.3<L>                        11.3<L>   11.97<H> >-----------< 158    (  @ 10:55 )             35.9                  Physical Exam:  General: NAD  Abdomen: soft, non-tender, non-distended, fundus firm  Vaginal: No heavy vaginal bleeding, laceration intact  Extremities: No erythema/edema
OB Progress Note:  PPD#1    S: 27yo  PPD#1 s/p  c/b 3rd degree laceration. Patient feels well. Pain is well controlled, tolerating regular diet, passing flatus, voiding spontaneously, ambulating without difficulty. Denies heavy vaginal bleeding, CP/SOB, N/V, lightheadedness/dizziness.     O:  Vitals:  Vital Signs Last 24 Hrs  T(C): 36.7 (08 Aug 2019 05:21), Max: 37.3 (08 Aug 2019 02:14)  T(F): 98 (08 Aug 2019 05:21), Max: 99.1 (08 Aug 2019 02:14)  HR: 97 (08 Aug 2019 05:21) (59 - 120)  BP: 92/52 (08 Aug 2019 05:21) (90/53 - 152/65)  BP(mean): --  RR: 18 (08 Aug 2019 05:21) (16 - 18)  SpO2: 100% (08 Aug 2019 05:21) (88% - 100%)    MEDICATIONS  (STANDING):  acetaminophen   Tablet .. 975 milliGRAM(s) Oral <User Schedule>  diphtheria/tetanus/pertussis (acellular) Vaccine (ADAcel) 0.5 milliLiter(s) IntraMuscular once  docusate sodium 100 milliGRAM(s) Oral three times a day  ibuprofen  Tablet. 600 milliGRAM(s) Oral every 6 hours  prenatal multivitamin 1 Tablet(s) Oral daily  sodium chloride 0.9% lock flush 3 milliLiter(s) IV Push every 8 hours      Labs:  Blood type: A Positive  Rubella IgG: RPR: Negative                          8.1<L>   12.92<H> >-----------< 121<L>    (  @ 06:25 )             26.3<L>                        11.3<L>   11.97<H> >-----------< 158    (  @ 10:55 )             35.9                  Physical Exam:  General: NAD  Abdomen: soft, non-tender, non-distended, fundus firm  Vaginal: No heavy vaginal bleeding  Extremities: No erythema/edema

## 2019-08-09 NOTE — PROGRESS NOTE ADULT - ASSESSMENT
IUP 39 weeks  protraction  inadequate ctx  PLAN  pitocin augmentation  dw patient risk and benefit  RUTHIE Dunn
A/P: 27yo PPD#2 s/p  c/b 3rd degree laceration.  Patient is stable and doing well post-partum.
A/P: 27yo PPD#1 s/p .  Patient is stable and doing well post-partum.

## 2020-06-01 NOTE — OB PROVIDER IHI INDUCTION/AUGMENTATION NOTE - LABOR: CERVICAL CONSISTENCY
soft Azithromycin Counseling:  I discussed with the patient the risks of azithromycin including but not limited to GI upset, allergic reaction, drug rash, diarrhea, and yeast infections.

## 2020-12-04 NOTE — OB RN TRIAGE NOTE - NSLDARRIVAL_OBGYN_ALL_OB_DIFF_HHMM
As part of our commitment to your overall care and recovery, we would like to check on you after your discharge.  You may receive intermittent non-urgent calls for approximately 30 days after your discharge to:   -Check on your recovery  -Assist with arranging follow-up appointments  -Answer prescription related questions  -Assist with other questions that you may have about your care    We understand that you are in your recovery period and these calls should be brief.    Thank you.  Please call if you have any questions.    Idris Foley RN, MSN  Transitional Care RN  [phone]     1 Hour(s) 4 Minute(s)

## 2022-03-16 ENCOUNTER — ASOB RESULT (OUTPATIENT)
Age: 32
End: 2022-03-16

## 2022-03-16 ENCOUNTER — APPOINTMENT (OUTPATIENT)
Dept: ANTEPARTUM | Facility: CLINIC | Age: 32
End: 2022-03-16
Payer: COMMERCIAL

## 2022-03-16 PROCEDURE — 76813 OB US NUCHAL MEAS 1 GEST: CPT

## 2022-03-16 PROCEDURE — ZZZZZ: CPT

## 2022-03-16 PROCEDURE — 36416 COLLJ CAPILLARY BLOOD SPEC: CPT

## 2022-04-27 LAB
1ST TRIMESTER DATA: NORMAL
ADDENDUM DOC: NORMAL
AFP PNL SERPL: NORMAL
AFP SERPL-ACNC: NORMAL
CLINICAL BIOCHEMIST REVIEW: NORMAL
FREE BETA HCG 1ST TRIMESTER: NORMAL
Lab: NORMAL
NOTES NTD: NORMAL
NT: NORMAL
PAPP-A SERPL-ACNC: NORMAL
TRISOMY 18/3: NORMAL

## 2022-05-12 ENCOUNTER — APPOINTMENT (OUTPATIENT)
Dept: ANTEPARTUM | Facility: CLINIC | Age: 32
End: 2022-05-12
Payer: COMMERCIAL

## 2022-05-12 ENCOUNTER — ASOB RESULT (OUTPATIENT)
Age: 32
End: 2022-05-12

## 2022-05-12 PROCEDURE — 76817 TRANSVAGINAL US OBSTETRIC: CPT

## 2022-05-12 PROCEDURE — 76805 OB US >/= 14 WKS SNGL FETUS: CPT

## 2022-07-07 NOTE — OB RN DELIVERY SUMMARY - NS_EXTRAMURALDEL_OBGYN_ALL_OB
Good morning team,   Emilee will present every 3 months for follow up on BESPOKE clinical trial.    Plan  MD 5 M Follow up with vitals, Wt, and PS on 7/7/2022  Lab: CBC w/ diff, CMP, CEA, Signatera kit   Research: AE assessment      Plan Follow up  MD Follow up with vitals, Wt, and PS in 3 months due 10/7/2022 (+/- 45 days)  Lab: CBC w/ diff, CMP, CEA, Signatera kit   Research: AE assessment      Please let me know if you have any questions or concerns.    Celia Cintron, BSN, RN, OCN, CCRP  Oncology Research Coordinator Senior   No

## 2022-08-02 ENCOUNTER — ASOB RESULT (OUTPATIENT)
Age: 32
End: 2022-08-02

## 2022-08-02 ENCOUNTER — APPOINTMENT (OUTPATIENT)
Dept: ANTEPARTUM | Facility: CLINIC | Age: 32
End: 2022-08-02

## 2022-08-02 PROCEDURE — 76816 OB US FOLLOW-UP PER FETUS: CPT

## 2022-08-02 PROCEDURE — 76819 FETAL BIOPHYS PROFIL W/O NST: CPT

## 2022-09-22 ENCOUNTER — INPATIENT (INPATIENT)
Facility: HOSPITAL | Age: 32
LOS: 2 days | Discharge: HOME | End: 2022-09-25
Attending: OBSTETRICS & GYNECOLOGY | Admitting: OBSTETRICS & GYNECOLOGY

## 2022-09-22 VITALS
TEMPERATURE: 98 F | SYSTOLIC BLOOD PRESSURE: 105 MMHG | RESPIRATION RATE: 16 BRPM | DIASTOLIC BLOOD PRESSURE: 66 MMHG | HEART RATE: 83 BPM

## 2022-09-22 DIAGNOSIS — O26.899 OTHER SPECIFIED PREGNANCY RELATED CONDITIONS, UNSPECIFIED TRIMESTER: ICD-10-CM

## 2022-09-22 DIAGNOSIS — Z3A.00 WEEKS OF GESTATION OF PREGNANCY NOT SPECIFIED: ICD-10-CM

## 2022-09-22 LAB
BASOPHILS # BLD AUTO: 0.02 K/UL — SIGNIFICANT CHANGE UP (ref 0–0.2)
BASOPHILS NFR BLD AUTO: 0.3 % — SIGNIFICANT CHANGE UP (ref 0–2)
BLD GP AB SCN SERPL QL: NEGATIVE — SIGNIFICANT CHANGE UP
COVID-19 SPIKE DOMAIN AB INTERP: POSITIVE
COVID-19 SPIKE DOMAIN ANTIBODY RESULT: >250 U/ML — HIGH
EOSINOPHIL # BLD AUTO: 0.03 K/UL — SIGNIFICANT CHANGE UP (ref 0–0.5)
EOSINOPHIL NFR BLD AUTO: 0.4 % — SIGNIFICANT CHANGE UP (ref 0–6)
HCT VFR BLD CALC: 35.2 % — SIGNIFICANT CHANGE UP (ref 34.5–45)
HGB BLD-MCNC: 11.2 G/DL — LOW (ref 11.5–15.5)
IANC: 4.37 K/UL — SIGNIFICANT CHANGE UP (ref 1.8–7.4)
IMM GRANULOCYTES NFR BLD AUTO: 0.7 % — SIGNIFICANT CHANGE UP (ref 0–0.9)
LYMPHOCYTES # BLD AUTO: 2.03 K/UL — SIGNIFICANT CHANGE UP (ref 1–3.3)
LYMPHOCYTES # BLD AUTO: 28.8 % — SIGNIFICANT CHANGE UP (ref 13–44)
MCHC RBC-ENTMCNC: 26.3 PG — LOW (ref 27–34)
MCHC RBC-ENTMCNC: 31.8 GM/DL — LOW (ref 32–36)
MCV RBC AUTO: 82.6 FL — SIGNIFICANT CHANGE UP (ref 80–100)
MONOCYTES # BLD AUTO: 0.56 K/UL — SIGNIFICANT CHANGE UP (ref 0–0.9)
MONOCYTES NFR BLD AUTO: 7.9 % — SIGNIFICANT CHANGE UP (ref 2–14)
NEUTROPHILS # BLD AUTO: 4.37 K/UL — SIGNIFICANT CHANGE UP (ref 1.8–7.4)
NEUTROPHILS NFR BLD AUTO: 61.9 % — SIGNIFICANT CHANGE UP (ref 43–77)
NRBC # BLD: 0 /100 WBCS — SIGNIFICANT CHANGE UP (ref 0–0)
NRBC # FLD: 0.02 K/UL — HIGH (ref 0–0)
PLATELET # BLD AUTO: 160 K/UL — SIGNIFICANT CHANGE UP (ref 150–400)
RBC # BLD: 4.26 M/UL — SIGNIFICANT CHANGE UP (ref 3.8–5.2)
RBC # FLD: 15.4 % — HIGH (ref 10.3–14.5)
RH IG SCN BLD-IMP: POSITIVE — SIGNIFICANT CHANGE UP
SARS-COV-2 IGG+IGM SERPL QL IA: >250 U/ML — HIGH
SARS-COV-2 IGG+IGM SERPL QL IA: POSITIVE
SARS-COV-2 RNA SPEC QL NAA+PROBE: SIGNIFICANT CHANGE UP
WBC # BLD: 7.06 K/UL — SIGNIFICANT CHANGE UP (ref 3.8–10.5)
WBC # FLD AUTO: 7.06 K/UL — SIGNIFICANT CHANGE UP (ref 3.8–10.5)

## 2022-09-22 RX ORDER — OXYTOCIN 10 UNIT/ML
333.33 VIAL (ML) INJECTION
Qty: 20 | Refills: 0 | Status: DISCONTINUED | OUTPATIENT
Start: 2022-09-22 | End: 2022-09-25

## 2022-09-22 RX ORDER — CHLORHEXIDINE GLUCONATE 213 G/1000ML
1 SOLUTION TOPICAL ONCE
Refills: 0 | Status: DISCONTINUED | OUTPATIENT
Start: 2022-09-22 | End: 2022-09-23

## 2022-09-22 RX ORDER — OXYTOCIN 10 UNIT/ML
VIAL (ML) INJECTION
Qty: 30 | Refills: 0 | Status: DISCONTINUED | OUTPATIENT
Start: 2022-09-22 | End: 2022-09-23

## 2022-09-22 RX ORDER — SODIUM CHLORIDE 9 MG/ML
1000 INJECTION, SOLUTION INTRAVENOUS
Refills: 0 | Status: DISCONTINUED | OUTPATIENT
Start: 2022-09-22 | End: 2022-09-23

## 2022-09-22 RX ORDER — CITRIC ACID/SODIUM CITRATE 300-500 MG
15 SOLUTION, ORAL ORAL EVERY 6 HOURS
Refills: 0 | Status: DISCONTINUED | OUTPATIENT
Start: 2022-09-22 | End: 2022-09-23

## 2022-09-22 RX ADMIN — SODIUM CHLORIDE 125 MILLILITER(S): 9 INJECTION, SOLUTION INTRAVENOUS at 16:23

## 2022-09-22 NOTE — OB PROVIDER TRIAGE NOTE - HISTORY OF PRESENT ILLNESS
31yF  @39w6d LUIS ALBERTO 22, GBS negative, presents to triage for evaluation of low FABY. Pt reports she presented for prenatal US () and found to have an FABY in the lower threshold. Denies vaginal bleeding, regular contractions, leakage of fluid, and reports fetal movement.  Denies fever, chills, headaches, changes in vision, chest pain, palpitations, SOB, cough, nausea, vomiting, diarrhea, constipation, urinary symptoms, edema.    Patient has been following up with Dr. Oviedo for pre- care. No complications throughout current pregnancy.     PMH: No pertinent PMH  PSH: None   Past OB/GYN Hx:    - G1:  (2019), 9lbs 1oz, no complications   Meds: PNV  All: NKDA  Social Hx: Denies alcohol, tobacco, drug use    PHYSICAL EXAM  Vital Signs Last 24 Hrs  T(C): 36.4 (22 Sep 2022 12:58), Max: 36.9 (22 Sep 2022 12:03)  T(F): 97.52 (22 Sep 2022 12:58), Max: 98.4 (22 Sep 2022 12:03)  HR: 78 (22 Sep 2022 12:57) (78 - 83)  BP: 109/66 (22 Sep 2022 12:57) (105/66 - 109/66)  BP(mean): --  RR: 16 (22 Sep 2022 12:03) (16 - 16)  SpO2: --        Gen: NAD  Head: NC/AT  Cardio: S1S2+, RRR  Resp: CTABL, no wheezing  Abdomen: Soft, NT/ND, BS+  Extremities: No LE edema bilaterally  SVE: deferred   NST: HR 150s, moderate variability, accelerations present, no decelerations, reactive   [place]: irregular contraction pattern   BPP: positive tone, gross movements, practice breathing. FABY: 5.07, NST: reactive 10/10      Asessment: 31yF  @39w6d LUIS ALBERTO 22, GBS negative presents for evaluation for low FABY.     Plan:  - Will continue antepartum care and management.  - NST/BPP   - Monitor FHT/toco.    To be discussed w/ Dr. Oviedo, Attending Physician     Marzena Fitzgerald PGY1

## 2022-09-22 NOTE — OB PROVIDER IHI INDUCTION/AUGMENTATION NOTE - NS_OBIHITYPE_OBGYN_ALL_OB
We care about your health; the following recommendations(s) have been made today:    Testing ordered today: Pulmonary Function Testing Instructions    Patient Name: Americo Rojas   Date: ___________ Time: _____________      What you need to do before your appointment:  · Wear loose fitting clothing so that you may breathe comfortably.  · Eat a light meal before testing.   · Do not smoke/vape or drink alcohol the day of the test.  · Avoid vigorous exercise within 30 minutes of testing.    · Continue to take all medications EXCEPT those listed below, unless you feel your condition will worsen and you will be unable to complete the testing.     Please do NOT take 6 hours prior to test   Albuterol Proair Ventolin Proventil   Atrovent Xopenex Combivent Duoneb       Please do NOT take 12 hours prior to test   Advair Dulera Flovent Pulmicort   Qvar Serevent Symbicort Wixela       Please do NOT take 24 hours prior to test   Anoro Breo Brovana Incruse   Singulair Spiriva Theophylline Trelegy     • NO Prednisone 14 days prior to lung test, UNLESS Prednisone is part of a long-term medication treatment plan.    For more information or if you have any questions, please call our office at   [phone]    What: LUNG TEST  Where: IMAGING DEPARTMENT - main floor    [facility]  [id number] [place] [address]      Please call Central Scheduling at [phone] to schedule your CT  please complete CT  in November, results will be called to you.    Please follow up with the above recommendation within the next couple of days, if you have any further questions or concerns, please contact one of our offices:     · [facility] at [phone]  · [facility] at [phone].    May I ask, if you receive a survey in the mail about our visit today, please take the time to complete and return it. It will help us continue to improve our performance and provide you with excellent  care.     Thank you for choosing this clinic for your medical care.     Pulmonary and Sleep Medicine  [facility]       Induction

## 2022-09-22 NOTE — OB PROVIDER H&P - HISTORY OF PRESENT ILLNESS
HPI:  31y  at 39w6d GA presents to L&D for IOL for oligohydramnios (FABY 3.51). LUIS ALBERTO 22. Patient found to have low amniotic fluid on 22. At that visit patient's FABY was ~5. Pt was then instructed to present to triage for further evaluation. At triage, patient monitered and evaluated via BPP/NST. NST was reactive but BPP 8/10 d/t oligohydramnios. Patient denies vaginal bleeding, contractions and leakage of fluid. She reports good fetal movement. Denies fevers, chills, nausea and vomiting. No other complaints at this time.   Prenatal course uncomplicated.     OBHx:          G1: , 2019, 9lbs 2oz, no complications   GynHx: denies fibroids, ovarian cysts, STD hx, or abnormal PAPs   PMH: Denies  PSHx: Denies   Social Hx: Denies EtOH, tobacco and illicit drug use during this pregnancy; feels safe at home  Psych Hx: Denies hx of anxiety or depression  Meds: PNVs  Allergies: NKDA    Will accept blood transfusions? Yes    EFW: [id number] by Leopold's     GBS: negative     Physical Exam:   T(C): 36.4 (22 @ 15:24), Max: 36.9 (22 @ 12:03)  HR: 74 (22 @ 15:24) (74 - 83)  BP: 117/77 (22 @ 15:24) (105/66 - 117/77)  RR: 15 (22 @ 15:24) (15 - 16)  SpO2: --  Gen: NAD, A&Ox3  Heart: Nml S1,S2, RRR  Lungs: clear to auscultation bilaterally, no rales or crackles  Abd: Soft, nontender, gravid abd   Ext: No edema, moving all extremities with ease   SVE: 0.5/50/-3  Bedside sono:            -presentation: cephalic            -AFBY: 3.5  FHT: 150 baseline, moderate variability, +accels, -decels  [place]: irregular ctx pattern       Assessment/Plan: 31y  at 39w6d GA admitted to L&D for IOL for oligohydramnios. GBS negative.      -Admit to L&D  -Consent  -LnD Admission labs  -CLD or NPO, except ice chips   -IV fluids  -Labor: Intact, Latent labor. Irregular ctx pattern. Induce labor with BC/CB  -Fetus: Cat I tracing. Continuous toco and fetal monitoring.   -GBS: Negative, no GBS ppx required   -Analgesia: epi PRN  -DVT ppx: Ambulate and SCD's while in bed   - sex: male     Discussed with Dr. Oviedo, Attending Physician     Marzena Fitzgerald, PGY1

## 2022-09-22 NOTE — OB PROVIDER H&P - ATTENDING COMMENTS
Attending note     31y  at 39w6d GA admitted for induction of labor due to oligohydramnios (FABY 3.51). LUIS ALBERTO 22. Patient found to have low normal amniotic fluid on 22 and advised follow up visit for evaluation today and noted oligo on evaluation Plan admit for induction of labor Plan Cervical balloon and  Buccal Cytotec.

## 2022-09-22 NOTE — OB RN PATIENT PROFILE - NSLDARRIVAL_OBGYN_ALL_OB_START_DATE
Griseofulvin Pregnancy And Lactation Text: This medication is Pregnancy Category X and is known to cause serious birth defects. It is unknown if this medication is excreted in breast milk but breast feeding should be avoided. 22-Sep-2022 12:05

## 2022-09-22 NOTE — OB RN PATIENT PROFILE - FALL HARM RISK - UNIVERSAL INTERVENTIONS
Bed in lowest position, wheels locked, appropriate side rails in place/Call bell, personal items and telephone in reach/Instruct patient to call for assistance before getting out of bed or chair/[place] to call system/Physically safe environment - no spills, clutter or unnecessary equipment/Purposeful Proactive Rounding/Room/bathroom lighting operational, light cord in reach

## 2022-09-22 NOTE — OB RN PATIENT PROFILE - FUNCTIONAL ASSESSMENT - BASIC MOBILITY 6.
4-calculated by average/Not able to assess (calculate score using [facility] averaging method)

## 2022-09-22 NOTE — OB RN TRIAGE NOTE - FALL HARM RISK - UNIVERSAL INTERVENTIONS
Bed in lowest position, wheels locked, appropriate side rails in place/Call bell, personal items and telephone in reach/Instruct patient to call for assistance before getting out of bed or chair/[place] to call system/Physically safe environment - no spills, clutter or unnecessary equipment/Purposeful Proactive Rounding/Room/bathroom lighting operational, light cord in reach

## 2022-09-22 NOTE — OB PROVIDER LABOR PROGRESS NOTE - ASSESSMENT
30 yo  at 39/6 weeks   - cervical balloon placed without incident. pt tolerated well. 60cc/60cc instilled  - cont buccal cytotec  - cont EFM/toco    d/w DR Preet garrett PA-C 32 yo  at 39/6 weeks   - cervical balloon placed without incident. pt tolerated well. 60cc/60cc instilled  - cont buccal cytotec  - cont EFM/toco    d/w DR Preet garrett PA-C    Attending note   Agree with evaluation and management plan. BREANA Oviedo

## 2022-09-22 NOTE — OB RN PATIENT PROFILE - ARE SIGNIFICANT INDICATORS COMPLETE.
Pre operative instructions, medication directives and pain scales/post op pain management discussed with patient. All questions were answered. Patient re-assured regarding surgical procedure and day of surgery routine as needed. Patient verbalized understanding of pre-op instructions.   No Yes

## 2022-09-23 LAB
T PALLIDUM AB TITR SER: NEGATIVE — SIGNIFICANT CHANGE UP

## 2022-09-23 RX ORDER — IBUPROFEN 200 MG
600 TABLET ORAL EVERY 6 HOURS
Refills: 0 | Status: COMPLETED | OUTPATIENT
Start: 2022-09-23 | End: 2023-08-22

## 2022-09-23 RX ORDER — ACETAMINOPHEN 500 MG
975 TABLET ORAL
Refills: 0 | Status: DISCONTINUED | OUTPATIENT
Start: 2022-09-23 | End: 2022-09-25

## 2022-09-23 RX ORDER — IBUPROFEN 200 MG
600 TABLET ORAL EVERY 6 HOURS
Refills: 0 | Status: DISCONTINUED | OUTPATIENT
Start: 2022-09-23 | End: 2022-09-25

## 2022-09-23 RX ORDER — KETOROLAC TROMETHAMINE 30 MG/ML
30 SYRINGE (ML) INJECTION ONCE
Refills: 0 | Status: DISCONTINUED | OUTPATIENT
Start: 2022-09-23 | End: 2022-09-25

## 2022-09-23 RX ORDER — DIBUCAINE 1 %
1 OINTMENT (GRAM) RECTAL EVERY 6 HOURS
Refills: 0 | Status: DISCONTINUED | OUTPATIENT
Start: 2022-09-23 | End: 2022-09-25

## 2022-09-23 RX ORDER — SENNA PLUS 8.6 MG/1
1 TABLET ORAL
Refills: 0 | Status: DISCONTINUED | OUTPATIENT
Start: 2022-09-23 | End: 2022-09-25

## 2022-09-23 RX ORDER — MAGNESIUM HYDROXIDE 400 MG/1
30 TABLET, CHEWABLE ORAL
Refills: 0 | Status: DISCONTINUED | OUTPATIENT
Start: 2022-09-23 | End: 2022-09-25

## 2022-09-23 RX ORDER — SODIUM CHLORIDE 9 MG/ML
3 INJECTION INTRAMUSCULAR; INTRAVENOUS; SUBCUTANEOUS EVERY 8 HOURS
Refills: 0 | Status: DISCONTINUED | OUTPATIENT
Start: 2022-09-23 | End: 2022-09-25

## 2022-09-23 RX ORDER — PRAMOXINE HYDROCHLORIDE 150 MG/15G
1 AEROSOL, FOAM RECTAL EVERY 4 HOURS
Refills: 0 | Status: DISCONTINUED | OUTPATIENT
Start: 2022-09-23 | End: 2022-09-25

## 2022-09-23 RX ORDER — OXYTOCIN 10 UNIT/ML
333.33 VIAL (ML) INJECTION
Qty: 20 | Refills: 0 | Status: DISCONTINUED | OUTPATIENT
Start: 2022-09-23 | End: 2022-09-23

## 2022-09-23 RX ORDER — AER TRAVELER 0.5 G/1
1 SOLUTION RECTAL; TOPICAL EVERY 4 HOURS
Refills: 0 | Status: DISCONTINUED | OUTPATIENT
Start: 2022-09-23 | End: 2022-09-25

## 2022-09-23 RX ORDER — LANOLIN
1 OINTMENT (GRAM) TOPICAL EVERY 6 HOURS
Refills: 0 | Status: DISCONTINUED | OUTPATIENT
Start: 2022-09-23 | End: 2022-09-25

## 2022-09-23 RX ORDER — OXYCODONE HYDROCHLORIDE 5 MG/1
5 TABLET ORAL ONCE
Refills: 0 | Status: DISCONTINUED | OUTPATIENT
Start: 2022-09-23 | End: 2022-09-25

## 2022-09-23 RX ORDER — SIMETHICONE 80 MG/1
80 TABLET, CHEWABLE ORAL EVERY 4 HOURS
Refills: 0 | Status: DISCONTINUED | OUTPATIENT
Start: 2022-09-23 | End: 2022-09-25

## 2022-09-23 RX ORDER — HYDROCORTISONE 1 %
1 OINTMENT (GRAM) TOPICAL EVERY 6 HOURS
Refills: 0 | Status: DISCONTINUED | OUTPATIENT
Start: 2022-09-23 | End: 2022-09-25

## 2022-09-23 RX ORDER — DIPHENHYDRAMINE HCL 50 MG
25 CAPSULE ORAL EVERY 6 HOURS
Refills: 0 | Status: DISCONTINUED | OUTPATIENT
Start: 2022-09-23 | End: 2022-09-25

## 2022-09-23 RX ORDER — OXYCODONE HYDROCHLORIDE 5 MG/1
5 TABLET ORAL
Refills: 0 | Status: DISCONTINUED | OUTPATIENT
Start: 2022-09-23 | End: 2022-09-25

## 2022-09-23 RX ORDER — TETANUS TOXOID, REDUCED DIPHTHERIA TOXOID AND ACELLULAR PERTUSSIS VACCINE, ADSORBED 5; 2.5; 8; 8; 2.5 [IU]/.5ML; [IU]/.5ML; UG/.5ML; UG/.5ML; UG/.5ML
0.5 SUSPENSION INTRAMUSCULAR ONCE
Refills: 0 | Status: DISCONTINUED | OUTPATIENT
Start: 2022-09-23 | End: 2022-09-25

## 2022-09-23 RX ORDER — BENZOCAINE 10 %
1 GEL (GRAM) MUCOUS MEMBRANE EVERY 6 HOURS
Refills: 0 | Status: DISCONTINUED | OUTPATIENT
Start: 2022-09-23 | End: 2022-09-25

## 2022-09-23 RX ADMIN — SODIUM CHLORIDE 3 MILLILITER(S): 9 INJECTION INTRAMUSCULAR; INTRAVENOUS; SUBCUTANEOUS at 13:00

## 2022-09-23 RX ADMIN — Medication 600 MILLIGRAM(S): at 10:31

## 2022-09-23 RX ADMIN — SENNA PLUS 1 TABLET(S): 8.6 TABLET ORAL at 14:12

## 2022-09-23 RX ADMIN — Medication 975 MILLIGRAM(S): at 14:13

## 2022-09-23 RX ADMIN — Medication 975 MILLIGRAM(S): at 08:30

## 2022-09-23 RX ADMIN — Medication 1000 MILLIUNIT(S)/MIN: at 04:46

## 2022-09-23 RX ADMIN — Medication 600 MILLIGRAM(S): at 18:23

## 2022-09-23 RX ADMIN — Medication 975 MILLIGRAM(S): at 15:00

## 2022-09-23 RX ADMIN — Medication 600 MILLIGRAM(S): at 17:26

## 2022-09-23 RX ADMIN — Medication 975 MILLIGRAM(S): at 07:36

## 2022-09-23 RX ADMIN — MAGNESIUM HYDROXIDE 30 MILLILITER(S): 400 TABLET, CHEWABLE ORAL at 20:02

## 2022-09-23 RX ADMIN — Medication 1 TABLET(S): at 11:47

## 2022-09-23 RX ADMIN — Medication 600 MILLIGRAM(S): at 11:30

## 2022-09-23 RX ADMIN — SODIUM CHLORIDE 3 MILLILITER(S): 9 INJECTION INTRAMUSCULAR; INTRAVENOUS; SUBCUTANEOUS at 07:10

## 2022-09-23 NOTE — OB NEONATOLOGY/PEDIATRICIAN DELIVERY SUMMARY - NSPEDSNEONOTESA_OBGYN_ALL_OB_FT
Called by OBGYN to attend [facility] delivery due to meconium. Baby is product of a 39+6 week gestation born to a  31 y.o. F. Maternal labs include Blood Type A+, HIV-, RPR NR, Hep B-, GBS-. AROM on  at 1:48 with clear fluid. Delivery complicated by meconium fluid. Baby emerged crying and vigorous.  Infant was brought to radiant warmer and warmed, dried, stimulated and suctioned. HR>100, normal respiratory effort. with APGARS of 9/9 . Delayed cord clamping x30s performed. Highest maternal temperature 36.5 EOS score: 0.04. Admit to NBN. Mom plans to initiate breastfeeding, declines Hep B vaccine and consents circ.    Physical Exam:  Gen: NAD, +grimace  HEENT: (+) hemangioma noted to right forehead, not involving the eye, anterior fontanel open soft and flat, no cleft lip/palate, ears normal set, no ear pits or tags. no lesions in mouth/throat, nares clinically patent  Resp: no increased work of breathing, good air entry b/l, clear to auscultation bilaterally  Cardio: Normal S1/S2, regular rate and rhythm, no murmurs, rubs or gallops  Abd: soft, non tender, non distended, + bowel sounds, umbilical cord with 3 vessels  Neuro: +grasp/suck/orlin, normal tone  Extremities: negative echeverria and ortolani, moving all extremities, full range of motion x 4, no crepitus  Skin: (+) sacral dimple with base, pink, warm  Genitals: Normal male anatomy, testicles palpable in scrotum b/l, [place] 1, anus patent

## 2022-09-23 NOTE — OB NEONATOLOGY/PEDIATRICIAN DELIVERY SUMMARY - BABY A: APGAR 1 MIN REFLEX IRRITABILITY, DELIVERY
continue flecainide and atenolol  Follows with cardiology in the outpatient setting; not a candidate for a c   Secondary to history of GI bleed (2) cough or sneeze

## 2022-09-23 NOTE — OB RN DELIVERY SUMMARY - NS_SEPSISRSKCALC_OBGYN_ALL_OB_FT
EOS calculated successfully. EOS Risk Factor: 0.5/1000 live births ([facility] [place] [facility] [place] national incidence[place] GA=40w;Temp=98.4; ROM=0.233; GBS='Negative'; Antibiotics='No antibiotics or any antibiotics < 2 hrs prior to birth'

## 2022-09-23 NOTE — OB NEONATOLOGY/PEDIATRICIAN DELIVERY SUMMARY - NS_FINALEDD_OBGYN_ALL_OB_DT
ECZEMA    Eczema is dry, sensitive, itchy skin.  It usually runs in the family.  Most of the time one or both parents will have eczema or they had it when they were young children.  It tends to improve with age in part because people who have eczema learn to avoid the things that irritate their skin. As adults, they tend to have skin that cracks and dries if washed excessively and they may be sensitive to wool, soaps, perfumes, or jewelry.  For reasons that are not known, they may also have sweaty hands or feet.  The skin cells in patients with eczema are not as tightly connected as in other individuals so that the skin tends to lose water and overdry. The main defense against drying therefore, becomes skin oil.  Anything that preserves skin oil helps and anything that removes it aggravates the eczema.    GENERAL MEASURES:  Clothing:  Skin affected by eczema is sensitive and itchy.  Wool and other coarse material may irritate the skin.  Some synthetic materials that do no breathe will cause increased sweating which may lead to rashes and itching.  Cotton is best. It is not unusual for heavy winter clothing to cause temporary thickening of the skin at the knees and elbows.    Cleansing:  It is only natural to try to heal rashes by keeping them extra clean, but frequent bathing and use of soap tends to dry skin out and may worsen eczema.  Decreasing baths to 2-3 times per week may help.  Bar soaps that are soft and messy in the soap dish may be less irritating than harder soaps especially those with strong scents or antiperspirants.  Some children may not tolerate any soap and need a waterless skin .  Bleaches and fabric softeners may cause trouble in some individuals, especially in situations where clothes become damp with perspiration.    When to call: Please call if an area of skin shows signs of infection: pus, tenderness, yellow scabs or drainage. Please also call if medications or regiments have not  relieved itching within two weeks of regular use.    MOISTURIZING:  Skin with eczema cannot hold water.  It is very important to keep the skin moist.  Frequent application of oily lotions or ointments over dampened skin is crucial.  Unfortunately, no one ointment works for all persons with eczema.  Good results have been obtained with vegetable oil and Vaseline as well as more exotic commercial lotions. You will use a lot, so avoid expensive brands unless nothing else works.  Using a greasier preparation at bedtime may help if your child will tolerate it.    MEDICATIONS:  Prescriptions for eczema are mostly in the cortisone family.  They come in creams and ointment form. They can be used safely if the following guidelines are followed:  1. Never use these medicines on the face.  This may result in dents in the skin (pockmarks) and visible red blood vessels.  2. Do not use these medicines for more than two or three weeks unless specifically instructed to do so by your doctor.  3. Continue to avoid excess bathing, irritating soaps, and clothing. Use moisturizing oils  frequently to dry areas and especially after baths. Bathe with the coolest water comfortable.    Occasionally your physician will prescribe anti-itching medications.  These are usually taken orally (as a pill) and usually at bedtime to avoid scratching during sleep which may aggravate or infect the rash.  These medicines may all cause drowsiness in some people so double check if you are taking another medicine that may have drowsiness as a side effect and use caution with driving or operating machinery.   23-Sep-2022

## 2022-09-23 NOTE — OB RN DELIVERY SUMMARY - NSSELHIDDEN_OBGYN_ALL_OB_FT
[NS_DeliveryAttending1_OBGYN_ALL_OB_FT:[id number]],[NS_DeliveryRN_OBGYN_ALL_OB_FT:[id number]],[NS_DeliveryAssist1_OBGYN_ALL_OB_FT:[id number]=]

## 2022-09-23 NOTE — OB PROVIDER DELIVERY SUMMARY - NSSELHIDDEN_OBGYN_ALL_OB_FT
[NS_DeliveryAttending1_OBGYN_ALL_OB_FT:[id number]],[NS_DeliveryRN_OBGYN_ALL_OB_FT:[id number]],[NS_DeliveryAssist1_OBGYN_ALL_OB_FT:[id number]=]

## 2022-09-23 NOTE — OB PROVIDER DELIVERY SUMMARY - NSPROVIDERDELIVERYNOTE_OBGYN_ALL_OB_FT
of  a viable male infant over an intact perineum. OA position. The perineum with 2nd degree perineal laceration repaired with 2-0 and 3-0 Chromic sutures. Good restoration of anatomy and hemostasis. The placenta delivered spontaneously and noted intact condition. Cervix vaginal lateral walls and rectum noted intact.

## 2022-09-24 ENCOUNTER — TRANSCRIPTION ENCOUNTER (OUTPATIENT)
Age: 32
End: 2022-09-24

## 2022-09-24 RX ORDER — ACETAMINOPHEN 500 MG
3 TABLET ORAL
Qty: 0 | Refills: 0 | DISCHARGE
Start: 2022-09-24

## 2022-09-24 RX ADMIN — Medication 975 MILLIGRAM(S): at 02:39

## 2022-09-24 RX ADMIN — Medication 1 APPLICATION(S): at 17:37

## 2022-09-24 RX ADMIN — SODIUM CHLORIDE 3 MILLILITER(S): 9 INJECTION INTRAMUSCULAR; INTRAVENOUS; SUBCUTANEOUS at 06:10

## 2022-09-24 RX ADMIN — Medication 975 MILLIGRAM(S): at 23:39

## 2022-09-24 RX ADMIN — Medication 975 MILLIGRAM(S): at 02:09

## 2022-09-24 RX ADMIN — Medication 975 MILLIGRAM(S): at 17:36

## 2022-09-24 RX ADMIN — Medication 600 MILLIGRAM(S): at 05:33

## 2022-09-24 RX ADMIN — Medication 600 MILLIGRAM(S): at 14:20

## 2022-09-24 RX ADMIN — Medication 600 MILLIGRAM(S): at 13:49

## 2022-09-24 RX ADMIN — SENNA PLUS 1 TABLET(S): 8.6 TABLET ORAL at 17:36

## 2022-09-24 RX ADMIN — Medication 975 MILLIGRAM(S): at 18:05

## 2022-09-24 RX ADMIN — Medication 975 MILLIGRAM(S): at 08:44

## 2022-09-24 RX ADMIN — Medication 975 MILLIGRAM(S): at 09:15

## 2022-09-24 RX ADMIN — Medication 600 MILLIGRAM(S): at 06:10

## 2022-09-24 RX ADMIN — PRAMOXINE HYDROCHLORIDE 1 APPLICATION(S): 150 AEROSOL, FOAM RECTAL at 17:36

## 2022-09-24 RX ADMIN — SODIUM CHLORIDE 3 MILLILITER(S): 9 INJECTION INTRAMUSCULAR; INTRAVENOUS; SUBCUTANEOUS at 02:12

## 2022-09-24 RX ADMIN — MAGNESIUM HYDROXIDE 30 MILLILITER(S): 400 TABLET, CHEWABLE ORAL at 23:39

## 2022-09-24 RX ADMIN — Medication 600 MILLIGRAM(S): at 20:58

## 2022-09-24 RX ADMIN — Medication 1 APPLICATION(S): at 17:36

## 2022-09-24 RX ADMIN — SODIUM CHLORIDE 3 MILLILITER(S): 9 INJECTION INTRAMUSCULAR; INTRAVENOUS; SUBCUTANEOUS at 23:11

## 2022-09-24 RX ADMIN — SODIUM CHLORIDE 3 MILLILITER(S): 9 INJECTION INTRAMUSCULAR; INTRAVENOUS; SUBCUTANEOUS at 13:57

## 2022-09-24 RX ADMIN — Medication 600 MILLIGRAM(S): at 20:28

## 2022-09-24 RX ADMIN — Medication 1 TABLET(S): at 13:49

## 2022-09-24 NOTE — DISCHARGE NOTE OB - CARE PLAN
1 Principal Discharge DX:	Normal vaginal delivery  Assessment and plan of treatment:	admitted induced ,  , Postpartum care

## 2022-09-24 NOTE — DISCHARGE NOTE OB - MEDICATION SUMMARY - MEDICATIONS TO TAKE
I will START or STAY ON the medications listed below when I get home from the hospital:    ibuprofen 600 mg oral tablet  -- 1 tab(s) by mouth every 6 hours  -- Indication: For pain    acetaminophen 325 mg oral tablet  -- 3 tab(s) by mouth every 8 hours, As Needed  -- Indication: For pain    Prenatal 1 oral capsule  -- orally once a day  -- Indication: For postpartum    FeroSul 325 mg (65 mg elemental iron) oral tablet  -- 1 tab(s) by mouth 2 times a day   -- Check with your doctor before becoming pregnant.  Do not chew, break, or crush.  May discolor urine or feces.    -- Indication: For anemia

## 2022-09-24 NOTE — DISCHARGE NOTE OB - NS MD DC FALL RISK RISK
For information on Fall & Injury Prevention, visit: [email or web address] OR  [email or web address] OR  https://www.cdc.gov/steadi/patient.html

## 2022-09-24 NOTE — PROGRESS NOTE ADULT - SUBJECTIVE AND OBJECTIVE BOX
S: Patient doing well. Minimal lochia. Pain controlled.    O: Vital Signs Last 24 Hrs  T(C): 37 (24 Sep 2022 06:00), Max: 37 (24 Sep 2022 06:00)  T(F): 98.6 (24 Sep 2022 06:00), Max: 98.6 (24 Sep 2022 06:00)  HR: 65 (24 Sep 2022 06:00) (65 - 77)  BP: 103/60 (24 Sep 2022 06:00) (98/61 - 103/60)  BP(mean): --  RR: 18 (24 Sep 2022 06:00) (18 - 18)  SpO2: 99% (24 Sep 2022 06:00) (98% - 99%)    Parameters below as of 24 Sep 2022 06:00  Patient On (Oxygen Delivery Method): room air        Gen: NAD  Abd: soft, NT, ND, fundus firm below umbilicus  Lochia: moderate  Ext: no tenderness    Labs:                        11.2   7.06  )-----------( 160      ( 22 Sep 2022 15:40 )             35.2       A: 31y PPD# 1 s/p  doing well.    Plan: continue pp care 
Attending Note     admitted for IOL for Oligohydramnios     Vital Signs Last 24 Hrs  T(C): 36.5 (22 Sep 2022 23:20), Max: 36.9 (22 Sep 2022 12:03)  T(F): 97.7 (22 Sep 2022 23:20), Max: 98.4 (22 Sep 2022 12:03)  HR: 80 (23 Sep 2022 01:12) (66 - 83)  BP: 128/64 (23 Sep 2022 01:12) (105/66 - 146/76)  BP(mean): --  RR: 15 (22 Sep 2022 15:24) (15 - 16)  SpO2: 98% (23 Sep 2022 01:00) (94% - 98%)        FETAL HEART RATE: category 1 tracing     [place]:2-4 min apart     CERVICAL EXAM: 5/70/vtx /-2     PAIN SCALE (0-10):  8/10    Assessment/ plan   IOL for oligo  , balloon removed from posterior vaginal fornix , plan epidural as per patient's request and then AROM and pitocin if needed s/p time of last Cytotec administered           C Preet

## 2022-09-24 NOTE — DISCHARGE NOTE OB - CARE PROVIDER_API CALL
Guerda Oviedo)  Obstetrics and Gynecology  [address] [place] [address]  Phone: [phone]  Fax: [phone]  Follow Up Time:

## 2022-09-24 NOTE — DISCHARGE NOTE OB - PATIENT PORTAL LINK FT
You can access the FollowMyHealth Patient Portal offered by [facility] by registering at the following website: [email or web address]. By joining [employer]’s FollowMyHealth portal, you will also be able to view your health information using other applications (apps) compatible with our system.

## 2022-09-24 NOTE — DISCHARGE NOTE OB - HOSPITAL COURSE
31y  at 39w6d GA admitted for induction of labor due to oligohydramnios (FABY 3.51). LUIS ALBERTO 22.Cerrvical balloon and  Buccal Cytotec used for induction and successful  Boy  Uncomplicated postpartum course - Chronic acute anemia

## 2022-09-25 VITALS
OXYGEN SATURATION: 99 % | HEART RATE: 72 BPM | DIASTOLIC BLOOD PRESSURE: 62 MMHG | TEMPERATURE: 98 F | SYSTOLIC BLOOD PRESSURE: 102 MMHG | RESPIRATION RATE: 17 BRPM

## 2022-09-25 RX ADMIN — Medication 975 MILLIGRAM(S): at 05:38

## 2022-09-25 RX ADMIN — Medication 975 MILLIGRAM(S): at 00:09

## 2022-09-25 RX ADMIN — Medication 975 MILLIGRAM(S): at 14:46

## 2022-09-25 RX ADMIN — Medication 975 MILLIGRAM(S): at 13:46

## 2022-09-25 RX ADMIN — Medication 975 MILLIGRAM(S): at 06:08

## 2022-10-20 NOTE — OB RN PATIENT PROFILE - PATIENT REPRESENTATIVE: ( YOU CAN CHOOSE ANY PERSON THAT CAN ASSIST YOU WITH YOUR HEALTH CARE PREFERENCES, DOES NOT HAVE TO BE A SPOUSE, IMMEDIATE FAMILY OR SIGNIFICANT OTHER/PARTNER)
Does the patient have a moderate to severe fever?  No  Has the patient had a serious reaction to a flu shot before?   No  Has the patient ever had Guillian [place] Syndrome within 6 weeks of a previous flu shot?  No  Is the patient less than 6 months of age?  No    Patient is eligible to receive the vaccine based on all questions being answered as 'No'.  Pt here for flu shot  Reviewed pre vaccination questionnaire  Pt given vaccine information packet  Pt tolerated.   Declines

## 2023-01-30 NOTE — DISCHARGE NOTE OB - PROVIDER TOKENS
Quality 431: Preventive Care And Screening: Unhealthy Alcohol Use - Screening: Patient not identified as an unhealthy alcohol user when screened for unhealthy alcohol use using a systematic screening method Detail Level: Detailed Quality 130: Documentation Of Current Medications In The Medical Record: Current Medications Documented Quality 110: Preventive Care And Screening: Influenza Immunization: Influenza immunization was not ordered or administered, reason not given Quality 226: Preventive Care And Screening: Tobacco Use: Screening And Cessation Intervention: Patient screened for tobacco use and is an ex/non-smoker PROVIDER:[TOKEN:[1990:MIIS:[id number]]]

## 2023-03-01 VITALS
WEIGHT: 145 LBS | HEIGHT: 59 IN | DIASTOLIC BLOOD PRESSURE: 65 MMHG | BODY MASS INDEX: 29.23 KG/M2 | HEART RATE: 72 BPM | SYSTOLIC BLOOD PRESSURE: 99 MMHG

## 2023-12-13 PROBLEM — Z78.9 OTHER SPECIFIED HEALTH STATUS: Chronic | Status: ACTIVE | Noted: 2019-07-25

## 2024-01-22 ENCOUNTER — NON-APPOINTMENT (OUTPATIENT)
Age: 34
End: 2024-01-22

## 2024-01-22 DIAGNOSIS — Z83.3 FAMILY HISTORY OF DIABETES MELLITUS: ICD-10-CM

## 2024-01-22 DIAGNOSIS — Z82.49 FAMILY HISTORY OF ISCHEMIC HEART DISEASE AND OTHER DISEASES OF THE CIRCULATORY SYSTEM: ICD-10-CM

## 2024-01-22 DIAGNOSIS — Z78.9 OTHER SPECIFIED HEALTH STATUS: ICD-10-CM

## 2024-01-22 DIAGNOSIS — Z12.72 ENCOUNTER FOR SCREENING FOR MALIGNANT NEOPLASM OF VAGINA: ICD-10-CM

## 2024-01-22 DIAGNOSIS — Z82.0 FAMILY HISTORY OF EPILEPSY AND OTHER DISEASES OF THE NERVOUS SYSTEM: ICD-10-CM

## 2024-01-22 RX ORDER — FOLIC ACID/MULTIVIT,IRON,MINER 0.4MG-18MG
200 TABLET ORAL DAILY
Refills: 0 | Status: ACTIVE | COMMUNITY

## 2024-06-12 ENCOUNTER — APPOINTMENT (OUTPATIENT)
Dept: OBGYN | Facility: CLINIC | Age: 34
End: 2024-06-12

## 2024-06-12 VITALS
WEIGHT: 136 LBS | HEART RATE: 69 BPM | BODY MASS INDEX: 27.42 KG/M2 | HEIGHT: 59 IN | DIASTOLIC BLOOD PRESSURE: 70 MMHG | SYSTOLIC BLOOD PRESSURE: 108 MMHG

## 2024-06-12 DIAGNOSIS — N94.9 UNSPECIFIED CONDITION ASSOCIATED WITH FEMALE GENITAL ORGANS AND MENSTRUAL CYCLE: ICD-10-CM

## 2024-06-12 PROCEDURE — 76830 TRANSVAGINAL US NON-OB: CPT

## 2024-06-12 PROCEDURE — 99385 PREV VISIT NEW AGE 18-39: CPT | Mod: 25

## 2024-06-12 PROCEDURE — 99459 PELVIC EXAMINATION: CPT

## 2024-06-12 PROCEDURE — 99203 OFFICE O/P NEW LOW 30 MIN: CPT | Mod: 25

## 2024-06-12 NOTE — HISTORY OF PRESENT ILLNESS
[Patient reported PAP Smear was normal] : Patient reported PAP Smear was normal [No] : Patient does not have concerns regarding sex [TextBox_4] : 34 yo presents for annual. pt feels good  [PapSmeardate] : 3/1/23 [LMPDate] : 6/1/24 [TextBox_6] : 6/1/24 [FreeTextEntry1] : 6/1/24

## 2024-06-12 NOTE — PLAN
[FreeTextEntry1] : TVS done today  reviewed results with patient answered all questions and concerns RTO in 3 months to f/u on left ovarian cyst

## 2024-06-12 NOTE — PHYSICAL EXAM
[Chaperone Present] : A chaperone was present in the examining room during all aspects of the physical examination [[id number]] : A chaperone was present during the pelvic exam. [Appropriately responsive] : appropriately responsive [Alert] : alert [No Acute Distress] : no acute distress [No Lymphadenopathy] : no lymphadenopathy [Regular Rate Rhythm] : regular rate rhythm [No Murmurs] : no murmurs [Clear to Auscultation B/L] : clear to auscultation bilaterally [Soft] : soft [Non-tender] : non-tender [Non-distended] : non-distended [No HSM] : No HSM [No Lesions] : no lesions [No Mass] : no mass [Oriented x3] : oriented x3 [Examination Of The Breasts] : a normal appearance [No Masses] : no breast masses were palpable [Labia Majora] : normal [Labia Minora] : normal [Normal] : normal [Uterine Adnexae] : non-palpable [FreeTextEntry2] : GARRY Scanlon [FreeTextEntry6] : b/l adnexal fullness

## 2024-06-19 LAB
CYTOLOGY CVX/VAG DOC THIN PREP: NORMAL
HPV HIGH+LOW RISK DNA PNL CVX: NOT DETECTED

## 2024-09-24 ENCOUNTER — NON-APPOINTMENT (OUTPATIENT)
Age: 34
End: 2024-09-24

## 2024-09-24 NOTE — OB PROVIDER LABOR PROGRESS NOTE - NS_EFFACEMANT_OBGYN_ALL_OB_NU
50 Continue Regimen: fexofenadine 180 mg tablet Initiate Treatment: clobetasol 0.05 % topical ointment Detail Level: Detailed Render In Strict Bullet Format?: No Discontinue Regimen: triamcinolone acetonide 0.1 % topical ointment mixed with anti-itch lotion

## 2024-09-25 ENCOUNTER — APPOINTMENT (OUTPATIENT)
Dept: OBGYN | Facility: CLINIC | Age: 34
End: 2024-09-25
Payer: COMMERCIAL

## 2024-09-25 ENCOUNTER — ASOB RESULT (OUTPATIENT)
Age: 34
End: 2024-09-25

## 2024-09-25 VITALS
SYSTOLIC BLOOD PRESSURE: 94 MMHG | BODY MASS INDEX: 28.02 KG/M2 | WEIGHT: 139 LBS | HEIGHT: 59 IN | HEART RATE: 74 BPM | DIASTOLIC BLOOD PRESSURE: 57 MMHG

## 2024-09-25 DIAGNOSIS — N83.209 UNSPECIFIED OVARIAN CYST, UNSPECIFIED SIDE: ICD-10-CM

## 2024-09-25 PROCEDURE — 76830 TRANSVAGINAL US NON-OB: CPT

## 2024-09-25 PROCEDURE — 99459 PELVIC EXAMINATION: CPT

## 2024-09-25 PROCEDURE — 99214 OFFICE O/P EST MOD 30 MIN: CPT

## 2024-09-25 PROCEDURE — G0444 DEPRESSION SCREEN ANNUAL: CPT | Mod: 59

## 2024-10-03 NOTE — HISTORY OF PRESENT ILLNESS
[Patient reported PAP Smear was normal] : Patient reported PAP Smear was normal [N] : Patient does not use contraception [Y] : Patient is sexually active [No] : Patient does not have concerns regarding sex [PapSmeardate] : 6/12/24 [LMPDate] : 9/17/24 [FreeTextEntry1] : 9/17/24

## 2024-10-03 NOTE — DISCUSSION/SUMMARY
[FreeTextEntry1] :   GYN f/u evaluation today TVS done today hx of L ovarian cyst Anteverted uterus noted, EE measures 4.09mm Normal right ovary  Left ovary notes two follicles measuring 1.48cm and 0.98cm.  -reviewed with patient answered all questions and concerns Patient screened for depression - no signs of clinical depression. PHQ-2 on file RTO in June for annual   I, Kiley Claudio sole acting as scribe for Dr. Oviedo. 09/25/2024

## 2025-06-16 ENCOUNTER — NON-APPOINTMENT (OUTPATIENT)
Age: 35
End: 2025-06-16

## 2025-06-16 ENCOUNTER — ASOB RESULT (OUTPATIENT)
Age: 35
End: 2025-06-16

## 2025-06-16 ENCOUNTER — APPOINTMENT (OUTPATIENT)
Dept: OBGYN | Facility: CLINIC | Age: 35
End: 2025-06-16
Payer: COMMERCIAL

## 2025-06-16 VITALS
DIASTOLIC BLOOD PRESSURE: 65 MMHG | SYSTOLIC BLOOD PRESSURE: 101 MMHG | HEART RATE: 74 BPM | WEIGHT: 139 LBS | BODY MASS INDEX: 28.02 KG/M2 | HEIGHT: 59 IN

## 2025-06-16 PROBLEM — Z12.4 ENCOUNTER FOR PAPANICOLAOU SMEAR FOR CERVICAL CANCER SCREENING: Status: ACTIVE | Noted: 2025-06-16

## 2025-06-16 PROBLEM — Z11.51 SCREENING FOR HUMAN PAPILLOMAVIRUS (HPV): Status: ACTIVE | Noted: 2025-06-16

## 2025-06-16 PROCEDURE — 99395 PREV VISIT EST AGE 18-39: CPT | Mod: 25

## 2025-06-16 PROCEDURE — 99459 PELVIC EXAMINATION: CPT | Mod: NC

## 2025-06-16 PROCEDURE — 76830 TRANSVAGINAL US NON-OB: CPT

## 2025-06-16 PROCEDURE — 99213 OFFICE O/P EST LOW 20 MIN: CPT | Mod: 25

## 2025-06-17 PROBLEM — Z01.411 ENCOUNTER FOR WELL WOMAN EXAM WITH ABNORMAL FINDINGS: Status: ACTIVE | Noted: 2025-06-17

## 2025-06-17 LAB — HPV HIGH+LOW RISK DNA PNL CVX: NOT DETECTED

## 2025-06-19 LAB — CYTOLOGY CVX/VAG DOC THIN PREP: NORMAL
